# Patient Record
Sex: FEMALE | Race: WHITE | Employment: UNEMPLOYED
[De-identification: names, ages, dates, MRNs, and addresses within clinical notes are randomized per-mention and may not be internally consistent; named-entity substitution may affect disease eponyms.]

---

## 2023-07-05 ENCOUNTER — HOSPITAL ENCOUNTER (OUTPATIENT)
Facility: HOSPITAL | Age: 53
Setting detail: RECURRING SERIES
Discharge: HOME | End: 2023-07-08
Payer: OTHER GOVERNMENT

## 2023-07-05 PROCEDURE — 97162 PT EVAL MOD COMPLEX 30 MIN: CPT

## 2023-07-05 PROCEDURE — 97535 SELF CARE MNGMENT TRAINING: CPT

## 2023-07-05 NOTE — PROGRESS NOTES
[facility]  [address] [facility] [address], [address] [facility]  Ph [phone]  Fx [phone]    Plan of Care/ Statement of Necessity for Physical Therapy Services    Patient name: Johnna Flaherty Start of Care: 2023   Referral source: Germania Worley DO : 1970    Medical Diagnosis: Other symptoms and signs involving the genitourinary system [R39.89]   Onset Date:18    Treatment Diagnosis: Other symptoms and signs involving the genitourinary system [R39.89]   Prior Hospitalization: see medical history Provider#: [id number]   Medications: Verified on Patient summary List    Comorbidities:  (), 1 vaginal delivery (, some stitches 10# baby), R knee surgery, back surgery (discectomy), high cholesterol, high blood pressure. Might be going through menopause    Prior Level of Function: Chronic issues with valsalva          The Plan of Care and following information is based on the information from the initial evaluation. Assessment/ key information: Patient is a 46year old female presenting to Physical Therapy with c/o stress urinary incontinence which is limiting ability function at previous level and got worse recently when she tried to  running, although she had chronic issues with valsalva. Patient has no pain complaints at this time. Patient presents with decreased strength, coordination, and endurance of the pelvic floor muscles and decreased strength of postural stabilizers. Patient presents with poor understanding of bladder and bowel anatomy/function, dietary irritants, and urge suppression. I feel patient would benefit from skilled therapeutic intervention to optimize highest functional level possible.      Evaluation Complexity HistoryMEDIUM  Complexity : 1-2 comorbidities / personal factors will impact the outcome/ POC  ; Examination MEDIUM Complexity : 3 Standardized tests and measures addressin body structure, function, activity limitation and / or

## 2023-07-05 NOTE — PROGRESS NOTES
Physical Therapy Evaluation      Patient Name: Marcela Vences    Date: 2023    : 1970  Insurance: Payor:  EAST / Plan:  EAST / Product Type: *No Product type* /      Patient  verified no     Visit #   Current / Total 1 12   Time   In / Out 155 302   Pain   In / Out 0 0   Subjective Functional Status/Changes: Se eval   Changes to:  Meds, Allergies, Med Hx, Sx Hx? If yes, update Summary List See eval     TREATMENT AREA =  Other symptoms and signs involving the genitourinary system [R39.89]    SUBJECTIVE  Pain Level (0-10 scale): 0  []constant []intermittent []improving []worsening [x]no change since onset    Any medication changes, allergies to medications, adverse drug reactions, diagnosis change, or new procedure performed?: [x] No    [] Yes (see summary sheet for update)  Subjective functional status/changes:     PLOF: Chronic issues with valsalva  Limitations to PLOF: Can't do high impact, sneeze/ cough  Mechanism of Injury: Insidious onset  Current symptoms/Complaints: Having some incontinence issues, \"and it's been happening for a while\" (with s a sneeze or cough, not a lot of frequent). But recently she tried to start jogging, and when she started she noticed significant leakage - needed a pad. Met with PCP for her annual exam, and brought it up and they suggested coming here. Previous Treatment/Compliance: No  PMHx/Surgical Hx:  (), 1 vaginal delivery (, some stitches 10# baby), R knee surgery, back surgery (discectomy), high cholesterol, high blood pressure. Might be going through menopause   Work Hx: Housewife  Living Situation: Lives with   Pt Goals: \"I would like to not have bladder issues. \"  Barriers: []pain []financial []time []transportation []other  Motivation: Good  Substance use: [x]Alcohol []Tobacco []other:   Cognition: A & O x 3    Other:    Current urinary complaint  leaks with activity (stress induced)  leaks with urgency  stress

## 2023-07-12 ENCOUNTER — HOSPITAL ENCOUNTER (OUTPATIENT)
Facility: HOSPITAL | Age: 53
Setting detail: RECURRING SERIES
Discharge: HOME | End: 2023-07-15
Payer: OTHER GOVERNMENT

## 2023-07-12 PROCEDURE — 97112 NEUROMUSCULAR REEDUCATION: CPT

## 2023-07-12 PROCEDURE — 97535 SELF CARE MNGMENT TRAINING: CPT

## 2023-07-12 PROCEDURE — 97110 THERAPEUTIC EXERCISES: CPT

## 2023-07-12 NOTE — PROGRESS NOTES
PHYSICAL / OCCUPATIONAL THERAPY - DAILY TREATMENT NOTE (updated )    Patient Name: Mark Srivastava    Date: 2023    : 1970  Insurance: Payor:  EAST / Plan: [employer] EAST / Product Type: *No Product type* /      Patient  verified Yes     Visit #   Current / Total 2 12   Time   In / Out 1155 1240   Pain   In / Out 0 0   Subjective Functional Status/Changes: Pt reports mixed compliance with performing pelvic floor contractions   Changes to: Allergies, Med Hx, Sx Hx?   no       TREATMENT AREA =  Urge incontinence [N39.41]    OBJECTIVE      Therapeutic Procedures: Tx Min Billable or 1:1 Min (if diff from Tx Min) Procedure, Rationale, Specifics   27 27 [id number] Neuromuscular Re-Education (timed):  improve balance, coordination, kinesthetic sense, posture, core stability and proprioception to improve patient's ability to develop conscious control of individual muscles and awareness of position of extremities in order to progress to PLOF and address remaining functional goals. (see flow sheet as applicable)     Details if applicable:       10 10 [id number] Therapeutic Exercise (timed):  increase ROM, strength, coordination, balance, and proprioception to improve patient's ability to progress to PLOF and address remaining functional goals. (see flow sheet as applicable)     Details if applicable:     8 8 [id number] Self Care/Home Management (timed):  improve patient knowledge and understanding of instructed on KPFE/ demosntration  to improve patient's ability to progress to PLOF and address remaining functional goals.   (see flow sheet as applicable)     Details if applicable:            Details if applicable:            Details if applicable:     39 [phone] [place] Reminder: bill using total billable min of TIMED therapeutic procedures (example: do not include dry needle or estim unattended, both untimed codes, in totals to left)  8-22 min = 1 unit; 23-37 min = 2 units; 38-52 min = 3 units; 53-67 min = 4

## 2023-07-18 ENCOUNTER — HOSPITAL ENCOUNTER (OUTPATIENT)
Facility: HOSPITAL | Age: 53
Setting detail: RECURRING SERIES
Discharge: HOME | End: 2023-07-21
Payer: OTHER GOVERNMENT

## 2023-07-18 PROCEDURE — 97112 NEUROMUSCULAR REEDUCATION: CPT

## 2023-07-18 PROCEDURE — 97530 THERAPEUTIC ACTIVITIES: CPT

## 2023-07-18 PROCEDURE — 97110 THERAPEUTIC EXERCISES: CPT

## 2023-07-26 ENCOUNTER — HOSPITAL ENCOUNTER (OUTPATIENT)
Facility: HOSPITAL | Age: 53
Setting detail: RECURRING SERIES
Discharge: HOME | End: 2023-07-29
Payer: OTHER GOVERNMENT

## 2023-07-26 PROCEDURE — 97110 THERAPEUTIC EXERCISES: CPT

## 2023-07-26 PROCEDURE — 97112 NEUROMUSCULAR REEDUCATION: CPT

## 2023-07-26 NOTE — PROGRESS NOTES
suppression strategies that promote bladder & bowel health to aid in management of urinary urgency & incontinence. Eval: Pt consuming 120oz water, but wine and caffeine, & unaware of bladder fitness, dietary irritants & urge suppression strategies. Pt reports improvement in UI complaints evidenced by decrease in pad usage &/or amount of leakage by 50% to improve QOL. Eval: Pt using 1 pads (pantiliners) per day, generally damp (amt of leakage). Current:  1 pad with some days dry Progressing 7/18/2023     GOAL MET Pt will be able to maintain [address] for 5 sec, 5 reps with no accessory substitution or breath holding. Eval: PFMC 2 sec, 5 reps with glut substitutions & breath holding  7/12/2023:  Surface EMG demonstrated good ability to perform 5 sec holds for 10 reps. Pt was instructed on [employer] suyapa to help her maintain consistency with performing the exercises. GOAL MET      Long term goals: To be achieved in 12 treatments[de-identified]  Pt reports bladder continence 90% of the time with cough/sneeze/laugh & walking to the toilet. Eval: pt stress & urgency incontinence 3 times per week     Pt demonstrates PFMC 5/5 & ability to maintain contraction for 10 sec, 10 reps. Eval: PFM 1/5, 2 sec hold, 5 reps, with breath holding and glut substitution     Pt demonstrates improvement of current complaints evidenced by a 9 point  improvement in FOTO score. Eval: FOTO 64  FOTO score=established functional score where 100=no disability     Pt demonstrates independence with management tools & exercise program that are beneficial for current condition in order to feel comfortable with Pelvic floor PT D/C & not fear.   Eval: pt fearful of return to exercise & unaware of what activities to avoid to avoid exacerbation of current condition  Current: Progressed today, PROGRESSING 7/26 Access Code: [email or web address] [address] [facility] 7/26       PLAN  Yes  Continue plan of care  []  Upgrade activities as tolerated  []  Discharge due to :  []

## 2023-08-02 ENCOUNTER — HOSPITAL ENCOUNTER (OUTPATIENT)
Facility: HOSPITAL | Age: 53
Setting detail: RECURRING SERIES
Discharge: HOME | End: 2023-08-05
Payer: OTHER GOVERNMENT

## 2023-08-02 PROCEDURE — 97112 NEUROMUSCULAR REEDUCATION: CPT

## 2023-08-02 PROCEDURE — 97110 THERAPEUTIC EXERCISES: CPT

## 2023-08-02 PROCEDURE — 97535 SELF CARE MNGMENT TRAINING: CPT

## 2023-08-02 NOTE — PROGRESS NOTES
PHYSICAL / OCCUPATIONAL THERAPY - DAILY TREATMENT NOTE (updated )    Patient Name: Ming Griffin    Date: 2023    : 1970  Insurance: Payor: [employer] EAST / Plan: [employer] EAST / Product Type: *No Product type* /      Patient  verified Yes     Visit #   Current / Total 5 12   Time   In / Out 835 911   Pain   In / Out 0 0   Subjective Functional Status/Changes: Leakage is better, although can be a roller coaster. Trying to cut down on caffeine and wine. Changes to: Allergies, Med Hx, Sx Hx?   no       TREATMENT AREA =  Other symptoms and signs involving the genitourinary system [R39.89]    OBJECTIVE    Therapeutic Procedures: Tx Min Billable or 1:1 Min (if diff from Tx Min) Procedure, Rationale, Specifics   8 8 42208 Self Care/Home Management (timed):  improve patient knowledge and understanding of positioning, posture/ergonomics, activity modification, and bladder irritants and how to manage this  to improve patient's ability to progress to PLOF and address remaining functional goals. (see flow sheet as applicable)     Details if applicable:        Neuromuscular Re-Education (timed):  improve balance, coordination, kinesthetic sense, posture, core stability and proprioception to improve patient's ability to develop conscious control of individual muscles and awareness of position of extremities in order to progress to PLOF and address remaining functional goals. (see flow sheet as applicable)     Details if applicable:     8  24429 Therapeutic Exercise (timed):  increase ROM, strength, coordination, balance, and proprioception to improve patient's ability to progress to PLOF and address remaining functional goals.  (see flow sheet as applicable)     Details if applicable:     36 39 [facility] Totals Reminder: bill using total billable min of TIMED therapeutic procedures (example: do not include dry needle or estim unattended, both untimed codes, in totals to left)  8-22 min = 1 unit;

## 2023-08-02 NOTE — PROGRESS NOTES
[facility]  [address], [address] [facility]  Ph [phone]  Fx [phone]    Physical Therapy Progress Note  Patient name: Xochilt Winkler Start of Care: 2023   Referral source: Bear Cohen DO : 1970               Medical Diagnosis: Other symptoms and signs involving the genitourinary system [R39.89]    Onset Date:18               Treatment Diagnosis: Other symptoms and signs involving the genitourinary system [R39.89]   Prior Hospitalization: see medical history Provider#: [id number]   Medications: Verified on Patient summary List    Comorbidities:  (), 1 vaginal delivery (, some stitches 10# baby), R knee surgery, back surgery (discectomy), high cholesterol, high blood pressure. Might be going through menopause    Prior Level of Function: Chronic issues with valsalva    Visits from Start of Care: 5    Missed Visits: 0    Updated Goals/Measure of Progress: To be achieved in 7 treatments:    Short term goals: To be achieved in 6 treatments[de-identified]  GOAL MET  Pt demonstrates proper dietary/fluid habits & urge suppression strategies that promote bladder & bowel health to aid in management of urinary urgency & incontinence. Eval: Pt consuming 120oz water, but wine and caffeine, & unaware of bladder fitness, dietary irritants & urge suppression strategies. PN 8/Trying to cut back on caffine and wine, not sure that they are really triggers anyway, but aware of bladder fitness/ urge suppression strategies, etc. GOAL MET      Pt reports improvement in UI complaints evidenced by decrease in pad usage &/or amount of leakage by 50% to improve QOL. Eval: Pt using 1 pads (pantiliners) per day, generally damp (amt of leakage). Current:  1 pad with some days dry Progressing 2023  PN 8/2: Improved, 50%. \"I'm not leaking all the time, but there are some bad days. \" 1 panty liner per day, but more dry days.  PROGRESSING     GOAL MET Pt will be able to

## 2023-08-09 ENCOUNTER — HOSPITAL ENCOUNTER (OUTPATIENT)
Facility: HOSPITAL | Age: 53
Setting detail: RECURRING SERIES
Discharge: HOME | End: 2023-08-12
Payer: OTHER GOVERNMENT

## 2023-08-09 PROCEDURE — 97110 THERAPEUTIC EXERCISES: CPT

## 2023-08-09 PROCEDURE — 97112 NEUROMUSCULAR REEDUCATION: CPT

## 2023-08-09 NOTE — PROGRESS NOTES
8:30 AM Chase Specking, PT [facility]   8/22/2023  8:30 AM Brendon Richards [facility]   8/30/2023  8:30 AM Juan Manuel Spence, [facility]   9/6/2023  8:30 AM Chase Specking, PT [address]   9/13/2023  8:30 AM Chase Specking, PT [address]   9/20/2023  8:30 AM Chase Specking, PT [facility]   9/27/2023  8:30 AM Chase Specking, PT [facility]

## 2023-08-16 ENCOUNTER — HOSPITAL ENCOUNTER (OUTPATIENT)
Facility: HOSPITAL | Age: 53
Setting detail: RECURRING SERIES
Discharge: HOME | End: 2023-08-19
Payer: OTHER GOVERNMENT

## 2023-08-16 PROCEDURE — 97535 SELF CARE MNGMENT TRAINING: CPT

## 2023-08-16 PROCEDURE — 97112 NEUROMUSCULAR REEDUCATION: CPT

## 2023-08-16 PROCEDURE — 97530 THERAPEUTIC ACTIVITIES: CPT

## 2023-08-16 PROCEDURE — 97110 THERAPEUTIC EXERCISES: CPT

## 2023-08-16 NOTE — PROGRESS NOTES
PHYSICAL / OCCUPATIONAL THERAPY - DAILY TREATMENT NOTE (updated )    Patient Name: Henrietta Bey    Date: 2023    : 1970  Insurance: Payor:  EAST / Plan:  EAST / Product Type: *No Product type* /      Patient  verified Yes     Visit #   Current / Total 7 12   Time   In / Out 830 910   Pain   In / Out 0 0   Subjective Functional Status/Changes: Getting her period and noticing more heaviness during this time. Changes to: Allergies, Med Hx, Sx Hx?   no       TREATMENT AREA =  Other symptoms and signs involving the genitourinary system [R39.89]    OBJECTIVE      Therapeutic Procedures: Tx Min Billable or 1:1 Min (if diff from Tx Min) Procedure, Rationale, Specifics   12  10753 Therapeutic Exercise (timed):  increase ROM, strength, coordination, balance, and proprioception to improve patient's ability to progress to PLOF and address remaining functional goals. (see flow sheet as applicable)     Details if applicable:        Neuromuscular Re-Education (timed):  improve balance, coordination, kinesthetic sense, posture, core stability and proprioception to improve patient's ability to develop conscious control of individual muscles and awareness of position of extremities in order to progress to PLOF and address remaining functional goals. (see flow sheet as applicable)     Details if applicable:      00628 Therapeutic Activity (timed):  use of dynamic activities replicating functional movements to increase ROM, strength, coordination, balance, and proprioception in order to improve patient's ability to progress to PLOF and address remaining functional goals.   (see flow sheet as applicable)     Details if applicable:      17109 Self Care/Home Management (timed):  improve patient knowledge and understanding of reviewed impact of hormones on muscle strength, how certain parts of cycle can cause increased heaviness  to improve patient's ability to progress to PLOF and

## 2023-08-22 ENCOUNTER — TELEPHONE (OUTPATIENT)
Facility: HOSPITAL | Age: 53
End: 2023-08-22

## 2023-08-29 ENCOUNTER — TELEPHONE (OUTPATIENT)
Facility: HOSPITAL | Age: 53
End: 2023-08-29

## 2023-08-30 ENCOUNTER — APPOINTMENT (OUTPATIENT)
Facility: HOSPITAL | Age: 53
End: 2023-08-30
Payer: OTHER GOVERNMENT

## 2023-09-01 ENCOUNTER — TELEPHONE (OUTPATIENT)
Facility: HOSPITAL | Age: 53
End: 2023-09-01